# Patient Record
Sex: MALE | Race: BLACK OR AFRICAN AMERICAN
[De-identification: names, ages, dates, MRNs, and addresses within clinical notes are randomized per-mention and may not be internally consistent; named-entity substitution may affect disease eponyms.]

---

## 2019-11-25 ENCOUNTER — HOSPITAL ENCOUNTER (EMERGENCY)
Dept: HOSPITAL 72 - EMR | Age: 24
Discharge: HOME | End: 2019-11-25
Payer: COMMERCIAL

## 2019-11-25 VITALS — WEIGHT: 280 LBS | BODY MASS INDEX: 34.1 KG/M2 | HEIGHT: 76 IN

## 2019-11-25 VITALS — SYSTOLIC BLOOD PRESSURE: 130 MMHG | DIASTOLIC BLOOD PRESSURE: 84 MMHG

## 2019-11-25 VITALS — DIASTOLIC BLOOD PRESSURE: 84 MMHG | SYSTOLIC BLOOD PRESSURE: 130 MMHG

## 2019-11-25 DIAGNOSIS — Z20.2: ICD-10-CM

## 2019-11-25 DIAGNOSIS — R30.0: Primary | ICD-10-CM

## 2019-11-25 PROCEDURE — 96372 THER/PROPH/DIAG INJ SC/IM: CPT

## 2019-11-25 PROCEDURE — 99284 EMERGENCY DEPT VISIT MOD MDM: CPT

## 2019-11-25 PROCEDURE — 96374 THER/PROPH/DIAG INJ IV PUSH: CPT

## 2019-11-25 NOTE — EMERGENCY ROOM REPORT
History of Present Illness


General


Chief Complaint:  Male Urogenital Problems


Source:  Patient





Present Illness


HPI


24-year-old male presents with dysuria x2 days no aggravating leaving factors 

severity is mild, no fevers no chills no new lesions, patient had unprotected 

oral sex 2 days ago, no nausea no vomiting no abdominal pain patient presents 

for evaluation


Allergies:  


Coded Allergies:  


     No Known Allergies (Unverified , 11/25/19)





Patient History


Past Medical History:  see triage record


Reviewed Nursing Documentation:  PMH: Agreed; PSxH: Agreed





Nursing Documentation-PMH


Past Medical History:  No Stated History





Review of Systems


All Other Systems:  negative except mentioned in HPI





Physical Exam





Vital Signs








  Date Time  Temp Pulse Resp B/P (MAP) Pulse Ox O2 Delivery O2 Flow Rate FiO2


 


11/25/19 11:15 98.1 90 19 130/84 (99) 97 Room Air  








General Appearance:  well appearing, no apparent distress


Head:  normocephalic, atraumatic


ENT:  hearing grossly normal, normal voice


Neck:  full range of motion, supple


Respiratory:  no respiratory distress, speaking full sentences


Genitourinary:  other - Chaperone Brianna RN, No lesions, uncircumcised penis,


Neurologic:  alert, normal gait


Psychiatric:  mood/affect normal


Skin:  no rash





Medical Decision Making


Diagnostic Impression:  


 Primary Impression:  


 STD exposure


ER Course


24-year-old male presents with possible STD exposure, will treat 

prophylactically ceftriaxone and azithro refer to orders, counseled patient to 

follow-up with PCP and obtain a STD testing


Disposition home with return precautions





Last Vital Signs








  Date Time  Temp Pulse Resp B/P (MAP) Pulse Ox O2 Delivery O2 Flow Rate FiO2


 


11/25/19 11:15 98.1 90 19 130/84 (99) 97 Room Air  








Condition:  Stable


Referrals:  


[facility]


Patient Instructions:  Chlamydia, Male, Gonorrhea, Urethritis, Adult





Additional Instructions:  


The patient was provided with discharge instructions, notified to follow-up 

with a primary care doctor and or specialist in the next 24-48 hours, and to 

return to the ED if they have worsening of their symptoms. 





Please note that this report is being documented using DRAGON technology.


This can lead to erroneous entry secondary to incorrect interpretation by the 

dictating instrument.











Praveen Danielle MD Nov 25, 2019 11:32

## 2019-11-25 NOTE — NUR
ED Nurse Note:



Patient walked in ED from home c/o painful urination x 2 days and requesting 
for STD testing. Patient denies discharge. ERMD at bedside at this time. Urine 
sample sent down to lab.

## 2019-12-21 ENCOUNTER — HOSPITAL ENCOUNTER (EMERGENCY)
Dept: HOSPITAL 72 - EMR | Age: 24
Discharge: HOME | End: 2019-12-21
Payer: COMMERCIAL

## 2019-12-21 VITALS — SYSTOLIC BLOOD PRESSURE: 134 MMHG | DIASTOLIC BLOOD PRESSURE: 92 MMHG

## 2019-12-21 VITALS — DIASTOLIC BLOOD PRESSURE: 92 MMHG | SYSTOLIC BLOOD PRESSURE: 134 MMHG

## 2019-12-21 VITALS — BODY MASS INDEX: 34.22 KG/M2 | WEIGHT: 281 LBS | HEIGHT: 76 IN

## 2019-12-21 DIAGNOSIS — N30.01: Primary | ICD-10-CM

## 2019-12-21 LAB
APPEARANCE UR: (no result)
APTT PPP: YELLOW S
GLUCOSE UR STRIP-MCNC: NEGATIVE MG/DL
KETONES UR QL STRIP: (no result)
LEUKOCYTE ESTERASE UR QL STRIP: (no result)
NITRITE UR QL STRIP: NEGATIVE
PH UR STRIP: 5 [PH] (ref 4.5–8)
PROT UR QL STRIP: (no result)
SP GR UR STRIP: 1.02 (ref 1–1.03)
UROBILINOGEN UR-MCNC: 1 MG/DL (ref 0–1)

## 2019-12-21 PROCEDURE — 99283 EMERGENCY DEPT VISIT LOW MDM: CPT

## 2019-12-21 PROCEDURE — 87086 URINE CULTURE/COLONY COUNT: CPT

## 2019-12-21 PROCEDURE — 81003 URINALYSIS AUTO W/O SCOPE: CPT

## 2019-12-21 NOTE — EMERGENCY ROOM REPORT
History of Present Illness


General


Chief Complaint:  Male Urogenital Problems


Source:  Patient





Present Illness


HPI


24-year-old male presents ED for evaluation.  Describing hematuria for the last 

2 weeks.  Describes some discomfort with urination.  States that the hematuria 

has been resolving but persists.  Pain is dull, 2 out of 10, nonradiating.  

Denies fevers or chills.  Denies flank pain.  States that he just a few days 

ago had multiple STD testing done and states they were all negative.  Was here 

last month for similar presentation.  Was subsequently treated and discharged 

states symptoms did improve.  States that he did have unprotected sex since 

then and symptoms returned.  No other aggravating relieving factors.  Denies 

any other associated symptoms


Allergies:  


Coded Allergies:  


     No Known Allergies (Unverified , 11/25/19)





Patient History


Past Medical History:  none


Past Surgical History:  none


Pertinent Family History:  none


Social History:  Denies: smoking, alcohol use, drug use


Immunizations:  UTD


Reviewed Nursing Documentation:  PMH: Agreed; PSxH: Agreed





Nursing Documentation-PMH


Past Medical History:  No Stated History





Review of Systems


All Other Systems:  negative except mentioned in HPI





Physical Exam





Vital Signs








  Date Time  Temp Pulse Resp B/P (MAP) Pulse Ox O2 Delivery O2 Flow Rate FiO2


 


12/21/19 20:47 98.6 90 18 134/92 (106) 98 Room Air  








Sp02 EP Interpretation:  reviewed, normal


General Appearance:  no apparent distress, alert, GCS 15, non-toxic


Head:  normocephalic, atraumatic


Eyes:  bilateral eye normal inspection, bilateral eye PERRL


ENT:  hearing grossly normal, normal pharynx, no angioedema, normal voice


Neck:  full range of motion, supple/symm/no masses


Respiratory:  chest non-tender, lungs clear, normal breath sounds, speaking 

full sentences


Cardiovascular #1:  regular rate, rhythm, no edema


Cardiovascular #2:  2+ carotid (R), 2+ carotid (L), 2+ radial (R), 2+ radial (L)

, 2+ dorsalis pedis (R), 2+ dorsalis pedis (L)


Gastrointestinal:  normal bowel sounds, non tender, soft, non-distended, no 

guarding, no rebound


Rectal:  deferred


Genitourinary:  normal inspection, no CVA tenderness


Musculoskeletal:  back normal, normal range of motion, gait/station normal, non-

tender


Neurologic:  alert, motor strength/tone normal, oriented x3, sensory intact, 

responsive, speech normal


Psychiatric:  judgement/insight normal, memory normal, mood/affect normal, no 

suicidal/homicidal ideation


Reflexes:  3+ bicep (R), 3+ bicep (L), 3+ tricep (R), 3+ tricep (L), 3+ knee (R)

, 3+ knee (L)


Lymphatic:  no adenopathy





Medical Decision Making


Diagnostic Impression:  


 Primary Impression:  


 Cystitis


ER Course


Hospital Course 


23 yo M presents with hematuria





Differential diagnoses include: UTI, cystitis, pyelonephritis 





Clinical course


Patient placed on stretcher.  After initial history and physical I ordered UA





UA + bacteria + blood





I discussed findings with the patient.  Patient states that he has had recent 

STD testing in the last few days which were negative for chlamydia, gonorrhea, 

syphilis, HIV.  We will treat as UTI.  I will prescribe antibiotics, Pyridium.  

States he does not have a PMD.  I will provide referrals





Diagnosis - cystitis





Stable and discharged home with prescriptions for Rx Keflex, pyridium.  

Instructed to followup with PMD.  Return to ED if symptoms recur or worsen





Labs








Test


  12/21/19


20:53


 


Urine Color Yellow 


 


Urine Appearance


  Slightly


cloudy


 


Urine pH 5 (4.5-8.0) 


 


Urine Specific Gravity


  1.020


(1.005-1.035)


 


Urine Protein 1+ (NEGATIVE) 


 


Urine Glucose (UA)


  Negative


(NEGATIVE)


 


Urine Ketones 1+ (NEGATIVE) 


 


Urine Blood 3+ (NEGATIVE) 


 


Urine Nitrite


  Negative


(NEGATIVE)


 


Urine Bilirubin


  Negative


(NEGATIVE)


 


Urine Urobilinogen


  1 MG/DL


(0.0-1.0)


 


Urine Leukocyte Esterase 2+ (NEGATIVE) 


 


Urine RBC


  5-10 /HPF (0 -


0)


 


Urine WBC


  15-20 /HPF (0


- 0)


 


Urine Squamous Epithelial


Cells Few /LPF


(NONE/OCC)


 


Urine Bacteria


  Few /HPF


(NONE)


 


Urine Mucus


  Few /LPF


(NONE/OCC)











Last Vital Signs








  Date Time  Temp Pulse Resp B/P (MAP) Pulse Ox O2 Delivery O2 Flow Rate FiO2


 


12/21/19 20:58 98.6 72 18 134/92 98 Room Air  








Status:  improved


Disposition:  HOME, SELF-CARE


Condition:  Stable


Scripts


Phenazopyridine Hcl* (PYRIDIUM*) 100 Mg Tablet


100 MG ORAL THREE TIMES A DAY for 3 Days, #9 TAB


   Prov: Grant Delong MD         12/21/19 


Cephalexin* (KEFLEX*) 500 Mg Capsule


500 MG ORAL EVERY 6 HOURS for 7 Days, #28 CAP


   Prov: Grant Delong MD         12/21/19











Grant Delong MD Dec 21, 2019 21:53

## 2019-12-21 NOTE — NUR
ED Nurse Note:

Patient walked into ED c/o hematuria for 2 weeks now, states that he was seen 
here last month and was given antibiotics and was advised to not have 
intercourse, but he did and now reports of repeat of symptoms from last visit. 
ao4. nad. vss. ambulates with steady gait. family at bedside.